# Patient Record
Sex: FEMALE | Race: WHITE | Employment: UNEMPLOYED | URBAN - METROPOLITAN AREA
[De-identification: names, ages, dates, MRNs, and addresses within clinical notes are randomized per-mention and may not be internally consistent; named-entity substitution may affect disease eponyms.]

---

## 2024-04-24 ENCOUNTER — TELEPHONE (OUTPATIENT)
Dept: NEUROLOGY | Age: 39
End: 2024-04-24

## 2024-04-24 ENCOUNTER — OFFICE VISIT (OUTPATIENT)
Dept: NEUROLOGY | Age: 39
End: 2024-04-24
Payer: MEDICARE

## 2024-04-24 VITALS
DIASTOLIC BLOOD PRESSURE: 78 MMHG | WEIGHT: 220 LBS | OXYGEN SATURATION: 96 % | HEIGHT: 63 IN | BODY MASS INDEX: 38.98 KG/M2 | SYSTOLIC BLOOD PRESSURE: 119 MMHG | HEART RATE: 69 BPM

## 2024-04-24 DIAGNOSIS — G43.009 MIGRAINE WITHOUT AURA AND WITHOUT STATUS MIGRAINOSUS, NOT INTRACTABLE: Primary | ICD-10-CM

## 2024-04-24 DIAGNOSIS — R51.9 MORNING HEADACHE: ICD-10-CM

## 2024-04-24 DIAGNOSIS — R44.3 HALLUCINATIONS: ICD-10-CM

## 2024-04-24 DIAGNOSIS — G47.33 OSA (OBSTRUCTIVE SLEEP APNEA): ICD-10-CM

## 2024-04-24 PROCEDURE — G8417 CALC BMI ABV UP PARAM F/U: HCPCS | Performed by: PSYCHIATRY & NEUROLOGY

## 2024-04-24 PROCEDURE — 4004F PT TOBACCO SCREEN RCVD TLK: CPT | Performed by: PSYCHIATRY & NEUROLOGY

## 2024-04-24 PROCEDURE — G8428 CUR MEDS NOT DOCUMENT: HCPCS | Performed by: PSYCHIATRY & NEUROLOGY

## 2024-04-24 PROCEDURE — 99204 OFFICE O/P NEW MOD 45 MIN: CPT | Performed by: PSYCHIATRY & NEUROLOGY

## 2024-04-24 RX ORDER — MAGNESIUM OXIDE 400 MG/1
400 TABLET ORAL DAILY
Qty: 30 TABLET | Refills: 1 | Status: SHIPPED | OUTPATIENT
Start: 2024-04-24

## 2024-04-24 NOTE — PROGRESS NOTES
III, IV, VI - extra-ocular muscles full: no pupillary defect; no ALEXSANDER, no nystagmus, no ptosis   V - normal facial sensation                                                               VII - normal facial symmetry                                                             VIII - intact hearing                                                                             IX, X - symmetrical palate                                                                  XI - symmetrical shoulder shrug                                                       XII - midline tongue without atrophy or fasciculation     Motor function  Normal muscle bulk and tone  Muscle strength: normal power 5/5  fine motor movements     Sensory function Intact to touch, pin prick, vibration, proprioception in bilateral upper and lower extremities.      Cerebellar Intact fine motor movement. No involuntary movements or tremors     Reflex function Intact 2+ DTR and symmetric. Negative Babinski     Gait                  Deferred.            PRIOR TESTS AND IMAGING: Following images and Labs were reviewed by the examiner             ASSESSMENT       Possible visual and auditory hallucinations in  a  patient with Trisomy 21     37 y/o female with down's syndrome presenting for possible visual or auditory hallucinations. Patient is unable to give clear details about history. She is doing better on Abilify and Celexa. There is a possible concern of early dementia, however it is difficult to assess.   Patient may benefit from psychiatry evaluation regarding hallucinations. She also endorses morning headaches that occur daily without associated nausea or vomiting.       PLAN:     Continue Abilify 15 mg and Celexa 40 mg daily.   Start Magnesium Oxide 400 mg daily for headache prophylaxis.   Consult psychiatry for hallucinations.     Case discussed with Dr. Chacko attending.     Follow up in the clinic in 6 months.

## 2024-04-24 NOTE — PATIENT INSTRUCTIONS
Start taking Magnesium Oxide for headaches.   Continue to use CPAP.   Follow up with Psychiatry for hallucinations.

## 2024-04-24 NOTE — TELEPHONE ENCOUNTER
PT MRI brain images from [facility] that was done on 04/15/2024 are in the PACS for you to view

## 2024-07-26 ENCOUNTER — TELEPHONE (OUTPATIENT)
Dept: NEUROLOGY | Age: 39
End: 2024-07-26

## 2024-07-26 NOTE — TELEPHONE ENCOUNTER
Pt mother Hallie called and wanting to know if you were able to review the MRI of the brain images in PACS. Please advise